# Patient Record
Sex: FEMALE | Race: WHITE | Employment: PART TIME | URBAN - METROPOLITAN AREA
[De-identification: names, ages, dates, MRNs, and addresses within clinical notes are randomized per-mention and may not be internally consistent; named-entity substitution may affect disease eponyms.]

---

## 2017-09-29 ENCOUNTER — HOSPITAL ENCOUNTER (OUTPATIENT)
Dept: ULTRASOUND IMAGING | Age: 47
Discharge: HOME | End: 2017-09-29
Attending: NURSE PRACTITIONER
Payer: OTHER GOVERNMENT

## 2017-09-29 ENCOUNTER — HOSPITAL ENCOUNTER (OUTPATIENT)
Dept: MAMMOGRAPHY | Age: 47
Discharge: HOME | End: 2017-09-29
Attending: NURSE PRACTITIONER
Payer: OTHER GOVERNMENT

## 2017-09-29 DIAGNOSIS — N63.20 LUMP OF LEFT BREAST: ICD-10-CM

## 2017-09-29 PROCEDURE — 76642 ULTRASOUND BREAST LIMITED: CPT

## 2017-09-29 PROCEDURE — 77065 DX MAMMO INCL CAD UNI: CPT

## 2017-10-30 ENCOUNTER — OFFICE VISIT (OUTPATIENT)
Dept: SURGERY | Age: 47
End: 2017-10-30

## 2017-10-30 VITALS
HEART RATE: 62 BPM | DIASTOLIC BLOOD PRESSURE: 65 MMHG | WEIGHT: 177 LBS | HEIGHT: 63 IN | BODY MASS INDEX: 31.36 KG/M2 | SYSTOLIC BLOOD PRESSURE: 125 MMHG

## 2017-10-30 DIAGNOSIS — R92.2 DENSE BREASTS: Primary | ICD-10-CM

## 2017-10-30 NOTE — MR AVS SNAPSHOT
Visit Information Date & Time Provider Department Dept. Phone Encounter #  
 10/30/2017  6:74 PM Cesilia Nava MD [facility] [phone] [id number] Upcoming Health Maintenance Date Due DTaP/Tdap/Td series (1 - Tdap) 8/27/1993 PAP AKA CERVICAL CYTOLOGY 8/27/1993 INFLUENZA AGE 9 TO ADULT 8/1/2017 Allergies as of 10/30/2017  Review Complete On: 10/30/2017 By: Arjun Pacheco RN No Known Allergies Current Immunizations  Never Reviewed No immunizations on file. Not reviewed this visit Vitals BP Pulse Height(growth percentile) Weight(growth percentile) LMP BMI  
 125/65 (BP 1 Location: Left arm, BP Patient Position: Sitting) 62 5' 3\" (1.6 m) 177 lb (80.3 kg) 10/30/2017 31.35 kg/m2 OB Status Smoking Status Having regular periods Former Smoker BMI and BSA Data Body Mass Index Body Surface Area  
 31.35 kg/m 2 1.89 m 2 Your Updated Medication List  
  
   
This list is accurate as of: 10/30/17  3:20 PM.  Always use your most recent med list.  
  
  
  
  
 LEVORA-28 PO Take  by mouth. Patient Instructions Breast Lumps: Care Instructions Your Care Instructions Breast lumps are common, especially in women between ages 27 and 48. Many women's breasts feel lumpy and tender before their menstrual period. Women also may have lumps when they are breastfeeding. Breast lumps may go away after menopause. All new breast lumps in women after menopause should be checked by a doctor. Although lumps may be normal for you, it is important to have your doctor check any lump or thickness that is not like the rest of your breast to make sure it is not cancer. A lump may be larger, harder, or different from the rest of your breast tissue. Follow-up care is a key part of your treatment and safety.  Be sure to make and go to all appointments, and call your doctor if you are having problems. It's also a good idea to know your test results and keep a list of the medicines you take. How can you care for yourself at home? · Make an appointment to have a mammogram and other follow-up visits as recommended by your doctor. When should you call for help? Watch closely for changes in your health, and be sure to contact your doctor if: 
· You do not get better as expected. · Your breast has changed. · You have pain in your breast. 
· You have a discharge from your nipple. · A breast lump changes or does not go away. Where can you learn more? Go to http://josé-dee dee.info/. Enter J536 in the search box to learn more about \"Breast Lumps: Care Instructions. \" Current as of: October 13, 2016 Content Version: 11.4 © 9314-6250 Healthwise, Incorporated. Care instructions adapted under license by [employer] (which disclaims liability or warranty for this information). If you have questions about a medical condition or this instruction, always ask your healthcare professional. [address] any warranty or liability for your use of this information. Introducing [facility]! Carlton Roberto introduces Webtogs patient portal. Now you can access parts of your medical record, email your doctor's office, and request medication refills online. 1. In your internet browser, go to [email or web address] 2. Click on the First Time User? Click Here link in the Sign In box. You will see the New Member Sign Up page. 3. Enter your Webtogs Access Code exactly as it appears below. You will not need to use this code after youve completed the sign-up process. If you do not sign up before the expiration date, you must request a new code. · Webtogs Access Code: [email or web address] Expires: 1/28/2018  2:21 PM 
 
4.  Enter the last four digits of your Social Security Number (xxxx) and Date of Birth (mm/dd/yyyy) as indicated and click Submit. You will be taken to the next sign-up page. 5. Create a Bluechilli ID. This will be your Bluechilli login ID and cannot be changed, so think of one that is secure and easy to remember. 6. Create a Bluechilli password. You can change your password at any time. 7. Enter your Password Reset Question and Answer. This can be used at a later time if you forget your password. 8. Enter your e-mail address. You will receive e-mail notification when new information is available in [address] 9. Click Sign Up. You can now view and download portions of your medical record. 10. Click the Download Summary menu link to download a portable copy of your medical information. If you have questions, please visit the Frequently Asked Questions section of the Bluechilli website. Remember, Bluechilli is NOT to be used for urgent needs. For medical emergencies, dial 911. Now available from your iPhone and Android! Please provide this summary of care documentation to your next provider. Your primary care clinician is listed as Mike Fajardo. If you have any questions after today's visit, please call [phone].

## 2017-10-30 NOTE — PATIENT INSTRUCTIONS
Breast Lumps: Care Instructions  Your Care Instructions  Breast lumps are common, especially in women between ages 27 and 48. Many women's breasts feel lumpy and tender before their menstrual period. Women also may have lumps when they are breastfeeding. Breast lumps may go away after menopause. All new breast lumps in women after menopause should be checked by a doctor. Although lumps may be normal for you, it is important to have your doctor check any lump or thickness that is not like the rest of your breast to make sure it is not cancer. A lump may be larger, harder, or different from the rest of your breast tissue. Follow-up care is a key part of your treatment and safety. Be sure to make and go to all appointments, and call your doctor if you are having problems. It's also a good idea to know your test results and keep a list of the medicines you take. How can you care for yourself at home? · Make an appointment to have a mammogram and other follow-up visits as recommended by your doctor. When should you call for help? Watch closely for changes in your health, and be sure to contact your doctor if:  · You do not get better as expected. · Your breast has changed. · You have pain in your breast.  · You have a discharge from your nipple. · A breast lump changes or does not go away. Where can you learn more? Go to http://josé-dee dee.info/. Enter U611 in the search box to learn more about \"Breast Lumps: Care Instructions. \"  Current as of: October 13, 2016  Content Version: 11.4  © 2444-7892 [employer]. Care instructions adapted under license by [employer] (which disclaims liability or warranty for this information). If you have questions about a medical condition or this instruction, always ask your healthcare professional. [address] any warranty or liability for your use of this information.

## 2017-10-30 NOTE — PROGRESS NOTES
HISTORY OF PRESENT ILLNESS  Renuka Perdomo is a 41 y.o. female. HPI  NEW patient presents for consultation at the request of Joe Lujan NP for palpable LEFT breast lump and pain times about eight weeks. This is unchanged in size/character. Is painful particularly with palpation. She has no nipple discharge/retraction or skin change. She questionably feels a lump in the RIGHT breast as well. FH is significant for her half-sister who had a malignant phyllodes tumor at age 47-54 (had a lumpectomy and XRT). Recent LEFT diagnostic mammogram and LEFT breast ultrasound performed     Past Medical History:   Diagnosis Date    Cancer ([facility] [address] 2017    [place]oma [place]        Past Surgical History:   Procedure Laterality Date    HX MALIGNANT SKIN LESION EXCISION  2016    Melanoma on back - clear margins       Social History     Social History    Marital status:      Spouse name: N/A    Number of children: N/A    Years of education: N/A     Occupational History    Not on file. Social History Main Topics    Smoking status: Former Smoker     Types: Cigarettes    Smokeless tobacco: Never Used    Alcohol use No    Drug use: Not on file    Sexual activity: Not on file     Other Topics Concern    Not on file     Social History Narrative    No narrative on file       No current outpatient prescriptions on file prior to visit. No current facility-administered medications on file prior to visit. No Known Allergies    OB History     No data available          ROS  Constitutional: Negative    HENT: Negative. Eyes: Negative. Respiratory: Negative. Cardiovascular: Negative. Gastrointestinal: Negative. Genitourinary: Negative. Musculoskeletal: Negative. Skin: Negative. Neurological: Negative. Endo/Heme/Allergies: Negative. Psychiatric/Behavioral: Negative. Physical Exam   Cardiovascular: Normal rate and normal heart sounds.     Pulmonary/Chest: Breath sounds normal. Right breast exhibits no inverted nipple, no mass, no nipple discharge, no skin change and no tenderness. Left breast exhibits tenderness. Left breast exhibits no inverted nipple, no mass, no nipple discharge and no skin change. Breasts are symmetrical.       Lymphadenopathy:        Right cervical: No superficial cervical, no deep cervical and no posterior cervical adenopathy present. Left cervical: No superficial cervical, no deep cervical and no posterior cervical adenopathy present. Right axillary: No pectoral and no lateral adenopathy present. Left axillary: No pectoral and no lateral adenopathy present. ASSESSMENT and PLAN    ICD-10-CM ICD-9-CM    1. Dense breasts R92.2 793.82      Pt presents with BL breast masses, likely related to dense breast tissue. Pt's menstrual cycles have been irregular and she currently takes birth control to manage this. Recent mammo/US looks good. Normal exam today with thickening in upper outer left and right. F/u prn. This plan was reviewed with the patient and patient agrees. All questions were answered.     Written by Christal Garza, as dictated by Dr. Sanya Davila MD.

## 2017-10-30 NOTE — COMMUNICATION BODY
HISTORY OF PRESENT ILLNESS  Krystle Olmos is a 41 y.o. female. HPI  NEW patient presents for consultation at the request of Magdiel Mcguire NP for palpable LEFT breast lump and pain times about eight weeks. This is unchanged in size/character. Is painful particularly with palpation. She has no nipple discharge/retraction or skin change. She questionably feels a lump in the RIGHT breast as well. FH is significant for her half-sister who had a malignant phyllodes tumor at age 47-54 (had a lumpectomy and XRT). Recent LEFT diagnostic mammogram and LEFT breast ultrasound performed     Past Medical History:   Diagnosis Date    Cancer ([facility] [address] 2017    [place]oma ([place]        Past Surgical History:   Procedure Laterality Date    HX MALIGNANT SKIN LESION EXCISION  2016    Melanoma on back - clear margins       Social History     Social History    Marital status:      Spouse name: N/A    Number of children: N/A    Years of education: N/A     Occupational History    Not on file. Social History Main Topics    Smoking status: Former Smoker     Types: Cigarettes    Smokeless tobacco: Never Used    Alcohol use No    Drug use: Not on file    Sexual activity: Not on file     Other Topics Concern    Not on file     Social History Narrative    No narrative on file       No current outpatient prescriptions on file prior to visit. No current facility-administered medications on file prior to visit. No Known Allergies    OB History     No data available          ROS  Constitutional: Negative    HENT: Negative. Eyes: Negative. Respiratory: Negative. Cardiovascular: Negative. Gastrointestinal: Negative. Genitourinary: Negative. Musculoskeletal: Negative. Skin: Negative. Neurological: Negative. Endo/Heme/Allergies: Negative. Psychiatric/Behavioral: Negative. Physical Exam   Cardiovascular: Normal rate and normal heart sounds.     Pulmonary/Chest: Breath sounds normal. Right breast exhibits no inverted nipple, no mass, no nipple discharge, no skin change and no tenderness. Left breast exhibits tenderness. Left breast exhibits no inverted nipple, no mass, no nipple discharge and no skin change. Breasts are symmetrical.       Lymphadenopathy:        Right cervical: No superficial cervical, no deep cervical and no posterior cervical adenopathy present. Left cervical: No superficial cervical, no deep cervical and no posterior cervical adenopathy present. Right axillary: No pectoral and no lateral adenopathy present. Left axillary: No pectoral and no lateral adenopathy present. ASSESSMENT and PLAN    ICD-10-CM ICD-9-CM    1. Dense breasts R92.2 793.82      Pt presents with BL breast masses, likely related to dense breast tissue. Pt's menstrual cycles have been irregular and she currently takes birth control to manage this. Recent mammo/US looks good. Normal exam today with thickening in upper outer left and right. F/u prn. This plan was reviewed with the patient and patient agrees. All questions were answered.     Written by Kasi Amaya, as dictated by Dr. Yanira Cheung MD.

## 2017-10-30 NOTE — PROGRESS NOTES
HISTORY OF PRESENT ILLNESS  Angelina Gentile is a 41 y.o. female. HPI   NEW patient presents for consultation at the request of Rafal Rosenthal NP for palpable LEFT breast lump and pain times about eight weeks. This is unchanged in size/character. Is painful particularly with palpation. She has no nipple discharge/retraction or skin change. She questionably feels a lump in the RIGHT breast as well. FH is significant for her half-sister who had a malignant phyllodes tumor at age 47-54 (had a lumpectomy and XRT). Recent LEFT diagnostic mammogram and LEFT breast ultrasound performed 9/29/17 at [facility], BIRADS 0, incomplete, with nothing seen in the area where the patient palpates the lump. Review of Systems   Constitutional: Positive for weight loss. HENT: Negative. Eyes: Negative. Respiratory: Negative. Cardiovascular: Negative. Gastrointestinal: Positive for diarrhea. Genitourinary: Negative. Musculoskeletal: Negative. Skin: Negative. Neurological: Negative. Endo/Heme/Allergies: Negative. Psychiatric/Behavioral: The patient is nervous/anxious and has insomnia.         Physical Exam    ASSESSMENT and PLAN  {ASSESSMENT/PLAN:17629}

## 2017-10-30 NOTE — LETTER
10/30/2017 3:29 PM 
 
Patient:  Karen Paige YOB: 1970 Date of Visit: 10/30/2017 Dear Edyta Chang: Thank you for referring Ms. Karen Paige to me for evaluation/treatment. Below are the relevant portions of my assessment and plan of care. HISTORY OF PRESENT ILLNESS Karen Paige is a 41 y.o. female. HPI 
NEW patient presents for consultation at the request of Carina Ojeda NP for palpable LEFT breast lump and pain times about eight weeks. This is unchanged in size/character. Is painful particularly with palpation. She has no nipple discharge/retraction or skin change. She questionably feels a lump in the RIGHT breast as well. FH is significant for her half-sister who had a malignant phyllodes tumor at age 47-54 (had a lumpectomy and XRT). Recent LEFT diagnostic mammogram and LEFT breast ultrasound performed Past Medical History:  
Diagnosis Date  Cancer [facility]) 2017 [place]anoma [place] Past Surgical History:  
Procedure Laterality Date  HX MALIGNANT SKIN LESION EXCISION  2016 Melanoma on back - clear margins Social History Social History  Marital status:  Spouse name: N/A  
 Number of children: N/A  
 Years of education: N/A Occupational History  Not on file. Social History Main Topics  Smoking status: Former Smoker Types: Cigarettes  Smokeless tobacco: Never Used  Alcohol use No  
 Drug use: Not on file  Sexual activity: Not on file Other Topics Concern  Not on file Social History Narrative  No narrative on file No current outpatient prescriptions on file prior to visit. No current facility-administered medications on file prior to visit. No Known Allergies OB History No data available ROS Constitutional: Negative HENT: Negative. Eyes: Negative. Respiratory: Negative. Cardiovascular: Negative. Gastrointestinal: Negative. Genitourinary: Negative. Musculoskeletal: Negative. Skin: Negative. Neurological: Negative. Endo/Heme/Allergies: Negative. Psychiatric/Behavioral: Negative. Physical Exam  
Cardiovascular: Normal rate and normal heart sounds. Pulmonary/Chest: Breath sounds normal. Right breast exhibits no inverted nipple, no mass, no nipple discharge, no skin change and no tenderness. Left breast exhibits tenderness. Left breast exhibits no inverted nipple, no mass, no nipple discharge and no skin change. Breasts are symmetrical.  
 
 
Lymphadenopathy:  
     Right cervical: No superficial cervical, no deep cervical and no posterior cervical adenopathy present. Left cervical: No superficial cervical, no deep cervical and no posterior cervical adenopathy present. Right axillary: No pectoral and no lateral adenopathy present. Left axillary: No pectoral and no lateral adenopathy present. ASSESSMENT and PLAN 
  ICD-10-CM ICD-9-CM 1. Dense breasts R92.2 793.82 Pt presents with BL breast masses, likely related to dense breast tissue. Pt's menstrual cycles have been irregular and she currently takes birth control to manage this. Recent mammo/US looks good. Normal exam today with thickening in upper outer left and right. F/u prn. This plan was reviewed with the patient and patient agrees. All questions were answered. Written by Kasi Amaya, as dictated by Dr. Yanira Cheung MD.  
 
 
If you have questions, please do not hesitate to call me. I look forward to following Ms. Lucy Garcia along with you. Sincerely, 
 
Ok Shahid MD

## 2020-06-11 ENCOUNTER — OFFICE VISIT (OUTPATIENT)
Dept: SURGERY | Age: 50
End: 2020-06-11

## 2020-06-11 ENCOUNTER — DOCUMENTATION ONLY (OUTPATIENT)
Dept: SURGERY | Age: 50
End: 2020-06-11

## 2020-06-11 VITALS
TEMPERATURE: 98.2 F | SYSTOLIC BLOOD PRESSURE: 119 MMHG | DIASTOLIC BLOOD PRESSURE: 69 MMHG | HEIGHT: 63 IN | HEART RATE: 77 BPM | BODY MASS INDEX: 31.36 KG/M2 | WEIGHT: 177 LBS

## 2020-06-11 DIAGNOSIS — N63.10 BREAST MASS, RIGHT: ICD-10-CM

## 2020-06-11 DIAGNOSIS — Z12.31 SCREENING MAMMOGRAM, ENCOUNTER FOR: Primary | ICD-10-CM

## 2020-06-11 DIAGNOSIS — N64.4 MASTODYNIA OF RIGHT BREAST: ICD-10-CM

## 2020-06-11 RX ORDER — FLUCONAZOLE 150 MG/1
150 TABLET ORAL DAILY
Qty: 1 TAB | Refills: 0 | Status: SHIPPED | OUTPATIENT
Start: 2020-06-11 | End: 2020-06-12

## 2020-06-11 NOTE — LETTER
6/11/20 Patient: Nida YOB: 1970 Date of Visit: 6/11/2020 Mat Guevara NP 
[address] [address] VIA Facsimile: [phone] Dear Mat Guevara NP, Thank you for referring Ms. Alva to [address] [place] [address] for evaluation. My notes for this consultation are attached. If you have questions, please do not hesitate to call me. I look forward to following your patient along with you.  
 
 
Sincerely, 
 
Gianna Daley MD

## 2020-06-11 NOTE — PROGRESS NOTES
HISTORY OF PRESENT ILLNESS  Tan Shaikh is a 54 y.o. female. HPI ESTABLISHED patient here today with complaints of RIGHT breast pain and a \"possible\" RIGHT breast lump. She called to schedule a screening mammogram and was referred here. She was seen in 2017 for BILATERAL breast lumps and had a normal exam. The patient has a chronic recurrent fungal rash, improved with topical creams, but now worse now due to the hot, humid weather and hiking. FH is significant for her half-sister who had a malignant phyllodes tumor at age 47-54 (had a lumpectomy and XRT).    Mammogram - done at [facility] 10/2018 BI RADS 1    Past Medical History:   Diagnosis Date    Cancer [facility]) 2017    Melanoma ([place] on [place]      Past Surgical History:   Procedure Laterality Date    HX MALIGNANT SKIN LESION EXCISION  2016    Melanoma on back - clear margins      OB History    No obstetric history on file. Obstetric Comments   Menarche 17, LMP n/a, # of children 2, age of 4st delivery 26, Hysterectomy/oophorectomy NO/NO, Breast bx NO, history of breast feeding YES, BCP YES, Hormone therapy YES           Family History   Problem Relation Age of Onset    Cancer Mother 32        cervical cancer    Heart Disease Father     Breast Problems Sister      Social History     Tobacco Use    Smoking status: Former Smoker     Types: Cigarettes    Smokeless tobacco: Never Used   Substance Use Topics    Alcohol use: No      Prior to Admission medications    Medication Sig Start Date End Date Taking? Authorizing Provider   LEVONORGESTREL-ETHIN ESTRADIOL (LEVORA-28 PO) Take  by mouth. Yes Provider, Historical      No Known Allergies      Review of Systems   Constitutional: Negative. HENT: Negative. Eyes: Negative. Respiratory: Negative. Cardiovascular: Negative. Gastrointestinal: Negative. Genitourinary: Negative. Musculoskeletal: Negative. Skin: Positive for rash. Neurological: Negative.     Endo/Heme/Allergies: Negative. Psychiatric/Behavioral: Negative. Physical Exam  Chest:      Breasts: Breasts are symmetrical.         Right: No inverted nipple, mass (nodular breast tissue UOQ), nipple discharge, skin change or tenderness. Left: No inverted nipple, mass, nipple discharge, skin change or tenderness. Lymphadenopathy:      Upper Body:      Right upper body: No supraclavicular or axillary adenopathy. Left upper body: No supraclavicular or axillary adenopathy. BREAST ULTRASOUND  Indication: Right  breast UOQ. Nodular breast tissue  Technique: The RIGHT breast and axilla were scanned using a high-frequency linear-array near-field transducer  Findings: No abnormal mass, lesion, or shadowing noted. No cysts. No axillary lymphadenopathy  Impression: Normal breast tissue   Disposition: No worrisome finding on ultrasound  ASSESSMENT and PLAN    ICD-10-CM ICD-9-CM    1. Screening mammogram, encounter for Z12.31 V76.12 JATINDER MAMMO BI SCREENING INCL CAD   2. Mastodynia of right breast N64.4 611.71    3. Breast mass, right N63.10 611.72      1. Normal breast tissue. No worrisome finding on physical exam or ultrasound. 2. Breast pain is musculoskeletal.  May use heat and ibuprofen. 3. Pt will schedule a screening mammogram at [facility]. 4. Prescription for Diflucan 150mg x 1 to get there yeast infection under control.

## 2020-06-11 NOTE — PROGRESS NOTES
Dr. Alexa Black returned outside breast imaging CDs back to the patient at the end of her appointment today.

## 2020-06-11 NOTE — PATIENT INSTRUCTIONS
Breast Lumps: Care Instructions Your Care Instructions Breast lumps are common, especially in women between ages 27 and 48. Many women's breasts feel lumpy and tender before their menstrual period. Women also may have lumps when they are breastfeeding. Breast lumps may go away after menopause. All new breast lumps in women after menopause should be checked by a doctor. Although lumps may be normal for you, it is important to have your doctor check any lump or thickness that is not like the rest of your breast to make sure it is not cancer. A lump may be larger, harder, or different from the rest of your breast tissue. Follow-up care is a key part of your treatment and safety. Be sure to make and go to all appointments, and call your doctor if you are having problems. It's also a good idea to know your test results and keep a list of the medicines you take. How can you care for yourself at home? · Make an appointment to have a mammogram and other follow-up visits as recommended by your doctor. When should you call for help? Watch closely for changes in your health, and be sure to contact your doctor if: 
· You do not get better as expected. · Your breast has changed. · You have pain in your breast. 
· You have a discharge from your nipple. · A breast lump changes or does not go away. Where can you learn more? Go to http://josé-dee dee.info/ Enter W789 in the search box to learn more about \"Breast Lumps: Care Instructions. \" Current as of: November 8, 2019               Content Version: 12.5 © 9510-8713 Healthwise, Incorporated. Care instructions adapted under license by [employer] (which disclaims liability or warranty for this information). If you have questions about a medical condition or this instruction, always ask your healthcare professional. [address] any warranty or liability for your use of this information.

## 2020-06-23 ENCOUNTER — HOSPITAL ENCOUNTER (OUTPATIENT)
Dept: MAMMOGRAPHY | Age: 50
Discharge: HOME | End: 2020-06-23
Attending: SURGERY
Payer: OTHER GOVERNMENT

## 2020-06-23 DIAGNOSIS — Z12.31 SCREENING MAMMOGRAM, ENCOUNTER FOR: ICD-10-CM

## 2020-06-23 PROCEDURE — 77067 SCR MAMMO BI INCL CAD: CPT
